# Patient Record
Sex: FEMALE | Race: BLACK OR AFRICAN AMERICAN
[De-identification: names, ages, dates, MRNs, and addresses within clinical notes are randomized per-mention and may not be internally consistent; named-entity substitution may affect disease eponyms.]

---

## 2022-12-20 ENCOUNTER — APPOINTMENT (OUTPATIENT)
Dept: OBGYN | Facility: CLINIC | Age: 32
End: 2022-12-20
Payer: COMMERCIAL

## 2022-12-20 ENCOUNTER — APPOINTMENT (OUTPATIENT)
Dept: ANTEPARTUM | Facility: CLINIC | Age: 32
End: 2022-12-20

## 2022-12-20 VITALS — WEIGHT: 221 LBS | SYSTOLIC BLOOD PRESSURE: 124 MMHG | DIASTOLIC BLOOD PRESSURE: 77 MMHG

## 2022-12-20 DIAGNOSIS — Z36.82 ENCOUNTER FOR ANTENATAL SCREENING FOR NUCHAL TRANSLUCENCY: ICD-10-CM

## 2022-12-20 PROCEDURE — ZZZZZ: CPT

## 2022-12-20 PROCEDURE — 76813 OB US NUCHAL MEAS 1 GEST: CPT

## 2022-12-20 PROCEDURE — 76801 OB US < 14 WKS SINGLE FETUS: CPT

## 2022-12-20 NOTE — HISTORY OF PRESENT ILLNESS
[FreeTextEntry1] : Patient is a 32 year old presenting at 12w4d seen in my office today for nuchal translucency testing. EMERSON 6/30/23. The limitations of testing were discussed with the patient and she was informed that this is a screening test. If she desires a diagnostic test like CVS or Amniocentesis, this may be performed.

## 2022-12-20 NOTE — DISCUSSION/SUMMARY
[FreeTextEntry1] : The significance of nuchal translucency testing was explained to the patient and ultrasound was performed. The sensitivity of the test can be improved by combining with second trimester quad screening. This type of sequential testing minimally increases the false positive rate, but the detection rate for Down syndrome is increased. If the sequential testing is desired, a second stage quad should be drawn and sent. As an alternative, this can be done in our office. If the patient does not desire sequential testing, then a single marker for AFP may be sent to any lab after 15 completed weeks gestation.\par \par Prenatal diagnostic testing is clinically indicated for this patient. The limitations of NIPT testing were discussed with the patient and amniocentesis was noted to remain the gold standard for prenatal diagnostic testing. The significance of NIPT testing was reviewed which pt has already done. NT Blood was drawn and the results will be sent to your office in approximately 2 weeks. Sequential screening is recommended between 15-16 weeks. Anatomy scan is recommended at 19-20 weeks.

## 2023-01-27 ENCOUNTER — NON-APPOINTMENT (OUTPATIENT)
Age: 33
End: 2023-01-27

## 2023-01-27 ENCOUNTER — APPOINTMENT (OUTPATIENT)
Dept: MATERNAL FETAL MEDICINE | Facility: CLINIC | Age: 33
End: 2023-01-27

## 2023-01-30 ENCOUNTER — NON-APPOINTMENT (OUTPATIENT)
Age: 33
End: 2023-01-30

## 2023-02-03 ENCOUNTER — APPOINTMENT (OUTPATIENT)
Dept: MATERNAL FETAL MEDICINE | Facility: CLINIC | Age: 33
End: 2023-02-03
Payer: COMMERCIAL

## 2023-02-03 ENCOUNTER — ASOB RESULT (OUTPATIENT)
Age: 33
End: 2023-02-03

## 2023-02-03 PROCEDURE — G0108 DIAB MANAGE TRN  PER INDIV: CPT

## 2023-02-03 RX ORDER — BLOOD-GLUCOSE METER
W/DEVICE KIT MISCELLANEOUS
Qty: 1 | Refills: 0 | Status: ACTIVE | COMMUNITY
Start: 2023-02-03 | End: 1900-01-01

## 2023-02-03 RX ORDER — BLOOD-GLUCOSE METER
KIT MISCELLANEOUS
Qty: 2 | Refills: 1 | Status: ACTIVE | COMMUNITY
Start: 2023-02-03 | End: 1900-01-01

## 2023-02-03 RX ORDER — CHOLECALCIFEROL (VITAMIN D3) 10(400)/ML
DROPS ORAL
Qty: 2 | Refills: 2 | Status: ACTIVE | COMMUNITY
Start: 2023-02-03 | End: 1900-01-01

## 2023-02-06 ENCOUNTER — APPOINTMENT (OUTPATIENT)
Dept: OBGYN | Facility: CLINIC | Age: 33
End: 2023-02-06

## 2023-02-06 ENCOUNTER — APPOINTMENT (OUTPATIENT)
Dept: ANTEPARTUM | Facility: CLINIC | Age: 33
End: 2023-02-06
Payer: COMMERCIAL

## 2023-02-06 VITALS — DIASTOLIC BLOOD PRESSURE: 72 MMHG | SYSTOLIC BLOOD PRESSURE: 132 MMHG | WEIGHT: 220 LBS

## 2023-02-06 PROCEDURE — 76811 OB US DETAILED SNGL FETUS: CPT

## 2023-02-10 RX ORDER — ALCOHOL ANTISEPTIC PADS
PADS, MEDICATED (EA) TOPICAL
Qty: 1 | Refills: 1 | Status: ACTIVE | COMMUNITY
Start: 2023-02-10 | End: 1900-01-01

## 2023-02-10 RX ORDER — PEN NEEDLE, DIABETIC 32GX 5/32"
32G X 4 MM NEEDLE, DISPOSABLE MISCELLANEOUS
Qty: 1 | Refills: 1 | Status: ACTIVE | COMMUNITY
Start: 2023-02-10 | End: 1900-01-01

## 2023-02-16 ENCOUNTER — APPOINTMENT (OUTPATIENT)
Dept: MATERNAL FETAL MEDICINE | Facility: CLINIC | Age: 33
End: 2023-02-16
Payer: COMMERCIAL

## 2023-02-16 ENCOUNTER — ASOB RESULT (OUTPATIENT)
Age: 33
End: 2023-02-16

## 2023-02-16 PROCEDURE — G0108 DIAB MANAGE TRN  PER INDIV: CPT

## 2023-02-22 RX ORDER — SYRINGE-NEEDLE,INSULIN,0.5 ML 31 GX5/16"
31G X 5/16" SYRINGE, EMPTY DISPOSABLE MISCELLANEOUS
Qty: 1 | Refills: 0 | Status: ACTIVE | COMMUNITY
Start: 2023-02-22 | End: 1900-01-01

## 2023-02-27 ENCOUNTER — EMERGENCY (EMERGENCY)
Facility: HOSPITAL | Age: 33
LOS: 1 days | Discharge: HOME | End: 2023-02-27
Attending: EMERGENCY MEDICINE | Admitting: EMERGENCY MEDICINE
Payer: COMMERCIAL

## 2023-02-27 VITALS
DIASTOLIC BLOOD PRESSURE: 63 MMHG | RESPIRATION RATE: 16 BRPM | HEART RATE: 68 BPM | SYSTOLIC BLOOD PRESSURE: 104 MMHG | OXYGEN SATURATION: 99 % | TEMPERATURE: 99 F

## 2023-02-27 LAB
ALBUMIN SERPL ELPH-MCNC: 3.9 G/DL — SIGNIFICANT CHANGE UP (ref 3.3–5)
ALP SERPL-CCNC: 61 U/L — SIGNIFICANT CHANGE UP (ref 40–120)
ALT FLD-CCNC: 20 U/L — SIGNIFICANT CHANGE UP (ref 4–33)
ANION GAP SERPL CALC-SCNC: 11 MMOL/L — SIGNIFICANT CHANGE UP (ref 7–14)
APPEARANCE UR: CLEAR — SIGNIFICANT CHANGE UP
AST SERPL-CCNC: 18 U/L — SIGNIFICANT CHANGE UP (ref 4–32)
BACTERIA # UR AUTO: ABNORMAL
BASOPHILS # BLD AUTO: 0.04 K/UL — SIGNIFICANT CHANGE UP (ref 0–0.2)
BASOPHILS NFR BLD AUTO: 0.3 % — SIGNIFICANT CHANGE UP (ref 0–2)
BILIRUB SERPL-MCNC: <0.2 MG/DL — SIGNIFICANT CHANGE UP (ref 0.2–1.2)
BILIRUB UR-MCNC: NEGATIVE — SIGNIFICANT CHANGE UP
BUN SERPL-MCNC: 13 MG/DL — SIGNIFICANT CHANGE UP (ref 7–23)
CALCIUM SERPL-MCNC: 9.6 MG/DL — SIGNIFICANT CHANGE UP (ref 8.4–10.5)
CHLORIDE SERPL-SCNC: 105 MMOL/L — SIGNIFICANT CHANGE UP (ref 98–107)
CO2 SERPL-SCNC: 19 MMOL/L — LOW (ref 22–31)
COLOR SPEC: SIGNIFICANT CHANGE UP
CREAT SERPL-MCNC: 0.88 MG/DL — SIGNIFICANT CHANGE UP (ref 0.5–1.3)
DIFF PNL FLD: NEGATIVE — SIGNIFICANT CHANGE UP
EGFR: 90 ML/MIN/1.73M2 — SIGNIFICANT CHANGE UP
EOSINOPHIL # BLD AUTO: 0.26 K/UL — SIGNIFICANT CHANGE UP (ref 0–0.5)
EOSINOPHIL NFR BLD AUTO: 1.8 % — SIGNIFICANT CHANGE UP (ref 0–6)
EPI CELLS # UR: SIGNIFICANT CHANGE UP /HPF (ref 0–5)
GLUCOSE SERPL-MCNC: 89 MG/DL — SIGNIFICANT CHANGE UP (ref 70–99)
GLUCOSE UR QL: NEGATIVE — SIGNIFICANT CHANGE UP
HCG SERPL-ACNC: 4459 MIU/ML — SIGNIFICANT CHANGE UP
HCT VFR BLD CALC: 35.6 % — SIGNIFICANT CHANGE UP (ref 34.5–45)
HGB BLD-MCNC: 11.7 G/DL — SIGNIFICANT CHANGE UP (ref 11.5–15.5)
IANC: 10.87 K/UL — HIGH (ref 1.8–7.4)
IMM GRANULOCYTES NFR BLD AUTO: 0.7 % — SIGNIFICANT CHANGE UP (ref 0–0.9)
KETONES UR-MCNC: ABNORMAL
LEUKOCYTE ESTERASE UR-ACNC: NEGATIVE — SIGNIFICANT CHANGE UP
LYMPHOCYTES # BLD AUTO: 14.4 % — SIGNIFICANT CHANGE UP (ref 13–44)
LYMPHOCYTES # BLD AUTO: 2.06 K/UL — SIGNIFICANT CHANGE UP (ref 1–3.3)
MCHC RBC-ENTMCNC: 26.2 PG — LOW (ref 27–34)
MCHC RBC-ENTMCNC: 32.9 GM/DL — SIGNIFICANT CHANGE UP (ref 32–36)
MCV RBC AUTO: 79.8 FL — LOW (ref 80–100)
MONOCYTES # BLD AUTO: 0.97 K/UL — HIGH (ref 0–0.9)
MONOCYTES NFR BLD AUTO: 6.8 % — SIGNIFICANT CHANGE UP (ref 2–14)
NEUTROPHILS # BLD AUTO: 10.87 K/UL — HIGH (ref 1.8–7.4)
NEUTROPHILS NFR BLD AUTO: 76 % — SIGNIFICANT CHANGE UP (ref 43–77)
NITRITE UR-MCNC: NEGATIVE — SIGNIFICANT CHANGE UP
NRBC # BLD: 0 /100 WBCS — SIGNIFICANT CHANGE UP (ref 0–0)
NRBC # FLD: 0 K/UL — SIGNIFICANT CHANGE UP (ref 0–0)
PH UR: 5.5 — SIGNIFICANT CHANGE UP (ref 5–8)
PLATELET # BLD AUTO: 297 K/UL — SIGNIFICANT CHANGE UP (ref 150–400)
POTASSIUM SERPL-MCNC: 4.2 MMOL/L — SIGNIFICANT CHANGE UP (ref 3.5–5.3)
POTASSIUM SERPL-SCNC: 4.2 MMOL/L — SIGNIFICANT CHANGE UP (ref 3.5–5.3)
PROT SERPL-MCNC: 6.9 G/DL — SIGNIFICANT CHANGE UP (ref 6–8.3)
PROT UR-MCNC: NEGATIVE — SIGNIFICANT CHANGE UP
RBC # BLD: 4.46 M/UL — SIGNIFICANT CHANGE UP (ref 3.8–5.2)
RBC # FLD: 14.8 % — HIGH (ref 10.3–14.5)
RBC CASTS # UR COMP ASSIST: SIGNIFICANT CHANGE UP /HPF (ref 0–4)
SODIUM SERPL-SCNC: 135 MMOL/L — SIGNIFICANT CHANGE UP (ref 135–145)
SP GR SPEC: 1.02 — SIGNIFICANT CHANGE UP (ref 1.01–1.05)
UROBILINOGEN FLD QL: SIGNIFICANT CHANGE UP
WBC # BLD: 14.3 K/UL — HIGH (ref 3.8–10.5)
WBC # FLD AUTO: 14.3 K/UL — HIGH (ref 3.8–10.5)
WBC UR QL: SIGNIFICANT CHANGE UP /HPF (ref 0–5)

## 2023-02-27 PROCEDURE — 99285 EMERGENCY DEPT VISIT HI MDM: CPT

## 2023-02-27 NOTE — ED PROVIDER NOTE - CLINICAL SUMMARY MEDICAL DECISION MAKING FREE TEXT BOX
32-year-old female G1, P0, 22 weeks pregnant, GDM presents emergency department for episode of syncope. +LOC for a few seconds, fell on abdomen/side. Pt denies chest pain, palp, adnominal pain, decreased fetal movement, vag bleeding or discharge. Differentials include but not limited to dehydration, electrolyte abnormalities, PE, vasovagal syncope. PE considered but low concern, PERC negative. Will get labs, EKG, UA, urine culture. Will check FHR and speak with OB. Dispo likely home with ob follow up. 32-year-old female G1, P0, 22 weeks pregnant, GDM presents emergency department for episode of syncope. +LOC for a few seconds, fell on abdomen/side. Pt denies chest pain, palp, adnominal pain, decreased fetal movement, vag bleeding or discharge. Differentials include but not limited to dehydration, electrolyte abnormalities, PE, vasovagal syncope. PE considered but low concern, PERC negative. Will get labs, EKG, UA, urine culture. Will check FHR and speak with OB. Dispo likely home with ob follow up.    daniella: Patient FATAL is a 32-year-old G1, P0 who had a syncope today [place] warm subway [place].  She has syncopized previously.  She denies any abdominal pain shortness of breath fevers or chest pain.  Her EKG is sinus rhythm at 65 without any ischemic ST elevations or depressions no short CO no long QTc and no Brugada.  Fetal heart rate is 155 abdomen is soft nontender and gravid at 22 weeks gestation patient was seen by GYN as well and cleared.  Patient okay to go home is being given fluid to drink and is able to tolerate p.o.  There is no nausea and vomiting.

## 2023-02-27 NOTE — ED PROVIDER NOTE - PHYSICAL EXAMINATION
Constitutional: VS reviewed. Alert and orientedx3, well appearing, no apparent distress  HEENT: Atraumatic, EOMI  CV: RRR, no murmurs  Lungs: Clear and equal bilaterally, no wheezes, rales or crackles  Abdomen: Gravid abdomen, soft, nondistended, nontender  MSK: No deformities  Skin: Warm and dry. As visualized no rashes, lesions, bruising or erythema  Neuro: Strength 5/5 in all extremities. Sensation intact. Normal gait.   Lymph: No pitting edema in extremities.

## 2023-02-27 NOTE — CONSULT NOTE ADULT - ASSESSMENT
31yo  @22+3 wga (EMERSON 23) coming in after an episode of vasovagal symptoms on a hot and crowded train today. Patient had a witness fall onto her left side with her backpack cushioning the fall and no head trauma. Patient currently feels well and is asymptomatic. +FH to 152 and gross fetal movement here in ED.    # Syncope  - Likely vasovagal syncope. Recommend f/u with cardiology given that the patient has episodes of near syncope before during this pregnancy  - EKG wnl  - Patient denies obstetric complaints. Recommend f/u in the office. Her next appointment is in 2 days.    # GDMA2  - Patient to start 10units NPH HS per [facility] note 2023.  - Management per office    d/w Dr. Ashley Leyva, PGY2

## 2023-02-27 NOTE — ED ADULT NURSE NOTE - NSIMPLEMENTINTERV_GEN_ALL_ED
Implemented All Fall Risk Interventions:  [place] to call system. Call bell, personal items and telephone within reach. Instruct patient to call for assistance. Room bathroom lighting operational. Non-slip footwear when patient is off stretcher. Physically safe environment: no spills, clutter or unnecessary equipment. Stretcher in lowest position, wheels locked, appropriate side rails in place. Provide visual cue, wrist band, yellow gown, etc. Monitor gait and stability. Monitor for mental status changes and reorient to person, place, and time. Review medications for side effects contributing to fall risk. Reinforce activity limits and safety measures with patient and family.

## 2023-02-27 NOTE — ED PROVIDER NOTE - OBJECTIVE STATEMENT
32-year-old female G1, P0, 22 weeks pregnant, GDM presents emergency department for episode of syncope.  Patient [place] when she had feeling of being hot, vision blurriness and subsequently lost consciousness landing on left side/abdomen.  Syncopal episode witnessed by bystanders who helped her stand after.  Patient states she lost consciousness for only a few seconds.  Patient denies any shortness of breath, chest pain, dizziness, vision changes after episode.  Patient now feels well.  Patient has confirmed IUP, 22 weeks and is feeling fetal movement before and after syncopal episode.  Patient denies any vaginal bleeding or discharge.  Patient denies fevers, chills, headaches, chest pain, palpitations, SOB, abdominal pain, nausea/vomiting, diarrhea/constipation.

## 2023-02-27 NOTE — ED PROVIDER NOTE - PATIENT PORTAL LINK FT
You can access the FollowMyHealth Patient Portal offered by [facility] by registering at the following website: [email or web address]. By joining [employer]’s FollowMyHealth portal, you will also be able to view your health information using other applications (apps) compatible with our system.

## 2023-02-27 NOTE — ED PROVIDER NOTE - NSFOLLOWUPINSTRUCTIONS_ED_ALL_ED_FT
You were seen in the ED today for an episode of passing out and falling on your abdomen in pregnancy.    All of your lab results are in your paperwork.    The baby's heart rate was 153 which is normal at this point in pregnancy.     Please follow up with your OB in the next week.      If you experience any of the following please return to the ED:  - Passing out  - Chest pain  - Trouble breathing  - Vaginal bleeding  - Abdominal pain or cramping      Syncope, Adult    Outline of the head showing blood vessels that supply the brain.   Syncope is when you pass out or faint for a short time. It is caused by a sudden decrease in blood flow to the brain. This can happen for many reasons. It can sometimes happen when seeing blood, getting a shot (injection), or having pain or strong emotions. Most causes of fainting are not dangerous, but in some cases it can be a sign of a serious medical problem.    If you faint, get help right away. Call your local emergency services (911 in the [place]      Follow these instructions at home:    Watch for any changes in your symptoms. Take these actions to stay safe and help with your symptoms:    Knowing when you may be about to faint   •Signs that you may be about to faint include:  •Feeling dizzy or light-headed. It may feel like the room is spinning.      •Feeling weak.      •Feeling like you may vomit (nauseous).      •Seeing spots or seeing all white or all black.      •Having cold, clammy skin.      •Feeling warm and sweaty.      •Hearing ringing in the ears.      •If you start to feel like you might faint, sit or lie down right away. If sitting, lower your head down between your legs. If lying down, raise (elevate) your feet above the level of your heart.  •Breathe deeply and steadily. Wait until all of the symptoms are gone.      •Have someone stay with you until you feel better.        Medicines     •Take over-the-counter and prescription medicines only as told by your doctor.      •If you are taking blood pressure or heart medicine, sit up and stand up slowly. Spend a few minutes getting ready to sit and then stand. This can help you feel less dizzy.      Lifestyle     • Do not drive, use machinery, or play sports until your doctor says it is okay.      • Do not drink alcohol.      • Do not smoke or use any products that contain nicotine or tobacco. If you need help quitting, ask your doctor.      •Avoid hot tubs and saunas.      General instructions     •Talk with your doctor about your symptoms. You may need to have testing to help find the cause.      •Drink enough fluid to keep your pee (urine) pale yellow.    •Avoid standing for a long time. If you must stand for a long time, do movements such as:  •Moving your legs.       •Crossing your legs.       •Flexing and stretching your leg muscles.       •Squatting.        •Keep all follow-up visits.        Contact a doctor if:    •You have episodes of near fainting.        Get help right away if:    •You pass out or faint.      •You hit your head or are injured after fainting.    •You have any of these symptoms:  •Fast or uneven heartbeats (palpitations).      •Pain in your chest, belly, or back.      •Shortness of breath.        •You have jerky movements that you cannot control (seizure).      •You have a very bad headache.      •You are confused.      •You have problems with how you see (vision).      •You are very weak.      •You have trouble walking.      •You are bleeding from your mouth or your butt (rectum).      •You have black or tarry poop (stool).      These symptoms may be an emergency. Get help right away. Call your local emergency services (911 in the [place]    • Do not wait to see if the symptoms will go away.       • Do not drive yourself to the hospital.         Summary    •Syncope is when you pass out or faint for a short time. It is caused by a sudden decrease in blood flow to the brain.      •Signs that you may be about to faint include feeling dizzy or light-headed, feeling like you may vomit, seeing all white or all black, or having cold, clammy skin.      •If you start to feel like you might faint, sit or lie down right away. Lower your head if sitting, or raise (elevate) your feet if lying down. Breathe deeply and steadily. Wait until all of the symptoms are gone.      This information is not intended to replace advice given to you by your health care provider. Make sure you discuss any questions you have with your health care provider.

## 2023-02-27 NOTE — ED ADULT TRIAGE NOTE - CHIEF COMPLAINT QUOTE
Pt  about 22 weeks pregnant reports she was on train home when she experienced a hot flash and passed out falling on belly. Currently denies abd pain, dizziness, CP. Hx gestational diabetes, fingerstick 90. L&D notified.

## 2023-02-27 NOTE — CONSULT NOTE ADULT - SUBJECTIVE AND OBJECTIVE BOX
EMMIE FATAL  32y  Female [id number]    HPI: 33yo  @22+3 wga (EMERSON 23) coming in with an episode of witnessed syncope on the train today at 4:40pm. Patient reports feeling hot and sweaty and her vision started to go. She believes she only passed out for a few seconds because she was watching a TV show and when she woke up she was on the same scene. Bystanders said that she did not hit her head but she slid onto her left flank. The patient was wearing her backpack on her front and it cushioned her fall. Patient reports an incident a few weeks ago where she felt lightheaded and near syncope but she was able to drink water and grabbed a cold subway pole and it helped. Patient denies cardiac history. She denies abdominal pain, LOF, VB. She has felt fetal movement since falling onto her left side. +FHT to 152 in the ED. EKG performed in ED and NSR    Pregnancy c/b GDMA2, patient to start insulin soon. She had a recent anatomy ultrasound which was normal per her OB's office. Her next office appointment is Wednesday with Dr. Anders's office.     Name of GYN Physician: Dr. Drummond    POB: primigravida  Pgyn: Denies fibroids, cysts, endometriosis, STI's, abnormal pap smears   PMH: Denies  PSH: Denies  Meds: PNV, 81mg ASA  All: NKDA  SH: Denies smoking use, drug use, alcohol use.      Hospital Meds:   MEDICATIONS  (STANDING):    MEDICATIONS  (PRN):      FAMILY HISTORY:      Vital Signs Last 24 Hrs  T(C): 37 (2023 18:06), Max: 37 (2023 18:06)  T(F): 98.6 (2023 18:06), Max: 98.6 (2023 18:06)  HR: 68 (2023 18:06) (68 - 68)  BP: 104/63 (2023 18:06) (104/63 - 104/63)  BP(mean): --  RR: 16 (2023 18:06) (16 - 16)  SpO2: 99% (2023 18:06) (99% - 99%)    Parameters below as of 2023 18:06  Patient On (Oxygen Delivery Method): room air      Physical Exam:   General: sitting comfortably in bed, NAD   CV: RR S1, S2 no m/r/g  Lungs: CTA b/l, good air flow b/l   Abd: Soft, gravid, non-tender. Bowel sounds present.    Ext: non-tender b/l, no edema     LABS:                            11.7   14.30 )-----------( 297      ( 2023 19:50 )             35.6         135  |  105  |  13  ----------------------------<  89  4.2   |  19<L>  |  0.88    Ca    9.6      2023 19:50    TPro  6.9  /  Alb  3.9  /  TBili  <0.2  /  DBili  x   /  AST  18  /  ALT  20  /  AlkPhos  61      I&O's Detail      Urinalysis Basic - ( 2023 20:17 )    Color: Light Yellow / Appearance: Clear / S.017 / pH: x  Gluc: x / Ketone: Small  / Bili: Negative / Urobili: <2 mg/dL   Blood: x / Protein: Negative / Nitrite: Negative   Leuk Esterase: Negative / RBC: 0-1 /HPF / WBC 3-5 /HPF   Sq Epi: x / Non Sq Epi: small /HPF / Bacteria: Few

## 2023-02-27 NOTE — ED PROVIDER NOTE - ATTENDING CONTRIBUTION TO CARE
daniella: Patient FATAL is a 32-year-old G1, P0 who had a syncope today [place] on [place] warm subway [place].  She has syncopized previously.  She denies any abdominal pain shortness of breath fevers or chest pain.  Her EKG is sinus rhythm at 65 without any ischemic ST elevations or depressions no short MS no long QTc and no Brugada.  Fetal heart rate is 155 abdomen is soft nontender and gravid at 22 weeks gestation patient was seen by GYN as well and cleared.  Patient okay to go home is being given fluid to drink and is able to tolerate p.o.  There is no nausea and vomiting.    I performed a history and physical exam of the patient and discussed their management with the resident and /or advanced care provider. I reviewed the resident and /or ACP's note and agree with the documented findings and plan of care. My medical decison making and observations are found above.

## 2023-02-27 NOTE — ED ADULT NURSE NOTE - OBJECTIVE STATEMENT
32 yof presents A&Ox4, 22wks pregnant, c/o syncopal episode on the train. Pt states she experienced a hot flash while standing, then passed out. States she fell on her stomach but denies any abdominal pain, vaginal bleeding or discharge. No PMHx. Denies any prior syncopal episodes. Respirations even and unlabored. Pt denies any chest pain, sob, N/V/D. 18g IV placed in R AC. labs sent per order. Pt aware to call for assistance prior to getting up. bed in lowest position, side rails up, call bell in hand, safety maintained.

## 2023-02-27 NOTE — ED ADULT TRIAGE NOTE - TEMPERATURE IN CELSIUS (DEGREES C)
37 Oral Minoxidil Pregnancy And Lactation Text: This medication should only be used when clearly needed if you are pregnant, attempting to become pregnant or breast feeding.

## 2023-02-28 LAB
CULTURE RESULTS: SIGNIFICANT CHANGE UP
SPECIMEN SOURCE: SIGNIFICANT CHANGE UP

## 2023-03-01 ENCOUNTER — APPOINTMENT (OUTPATIENT)
Dept: OBGYN | Facility: CLINIC | Age: 33
End: 2023-03-01
Payer: COMMERCIAL

## 2023-03-01 ENCOUNTER — APPOINTMENT (OUTPATIENT)
Dept: ANTEPARTUM | Facility: CLINIC | Age: 33
End: 2023-03-01
Payer: COMMERCIAL

## 2023-03-01 VITALS — DIASTOLIC BLOOD PRESSURE: 69 MMHG | SYSTOLIC BLOOD PRESSURE: 120 MMHG | WEIGHT: 219 LBS

## 2023-03-01 PROCEDURE — 76816 OB US FOLLOW-UP PER FETUS: CPT

## 2023-03-01 PROCEDURE — 99213 OFFICE O/P EST LOW 20 MIN: CPT

## 2023-03-01 NOTE — HISTORY OF PRESENT ILLNESS
[FreeTextEntry1] : The patient was seen for follow up of her gestational diabetes. She is 21 weeks pregnant and is managed by diet and insulin. The patient has no new complaints and her blood glucose log was reviewed today. Pt recent A1C was 6% done on 11/14/22. EFW done today. Normal growth - Wt 1lbs 4oz. Normal fluid. Normal movement. +FHR. Pt states she passed out one day on the train. Advised her to get referral to cardio form her OB. Also advised her to stay hydrated. \par \par Fasting sugars range > 90 mg/dL.\par Post prandial sugars range < 140 mg/dL.\par \par The patient is keeping an adequate log and is compliant with treatment.\par \par Fetal biophysical profile was done and fetal status was reassuring.

## 2023-03-01 NOTE — DISCUSSION/SUMMARY
[FreeTextEntry1] : In order to reduce the complications associated with diabetes, the patient was advised to follow dietary advice with the goal of keeping her fasting sugars between 70-90 mg/dL and 1 hour postprandial sugars less than 140 mg/dL.\par \par Diet: The patient was started on a GDM diet based on her body weight and gestational age. The aim of the diet is to gain appropriate weight during pregnancy and not be ketonuric. The diet consists of 3 meals and 3 snacks daily. Each snack will be about 15% of total calories and the patient should have a total carbohydrate intake of about 40%. The patient was advised about the foods to avoid and to monitor her weight regularly.\par \par Exercise: Proper guidelines for exercise and activity were discussed with the patient.\par \par Home Glucose monitoring: The patient was instructed on glucose monitor use using a glucometer. She was advised to check her fasting and post prandial sugars and to record corresponding food intake on the log sheet provided to her today. She was informed that the goal of glucose control was to keep the fasting sugars less than 90 mg/dL and 1 hour post prandial sugars less than 130-140 mg/dL. Signs, symptoms, and management of hypoglycemia were also discussed with her.\par \par Fetal kick counting/fetal movement monitoring, signs and symptoms of preeclampsia,  labor, and fetal surveillance were discussed with her. She was advised to report on any significant change in pattern of fetal movement.\par \par The patient was advised of the need for serial monitoring of fetal growth and for assessment of fetal status. Separate ultrasound reports will be sent to your office. The patient was advised to return in 4 weeks for follow up. In the interim, she was advised to call if there are any problems. \par \par She is seeing a DM educator. She will continue to monitor sugars with DM educator.

## 2023-03-16 ENCOUNTER — ASOB RESULT (OUTPATIENT)
Age: 33
End: 2023-03-16

## 2023-03-16 ENCOUNTER — APPOINTMENT (OUTPATIENT)
Dept: MATERNAL FETAL MEDICINE | Facility: CLINIC | Age: 33
End: 2023-03-16
Payer: COMMERCIAL

## 2023-03-16 PROCEDURE — G0108 DIAB MANAGE TRN  PER INDIV: CPT

## 2023-03-28 ENCOUNTER — APPOINTMENT (OUTPATIENT)
Dept: ANTEPARTUM | Facility: CLINIC | Age: 33
End: 2023-03-28
Payer: COMMERCIAL

## 2023-03-28 ENCOUNTER — APPOINTMENT (OUTPATIENT)
Dept: OBGYN | Facility: CLINIC | Age: 33
End: 2023-03-28
Payer: COMMERCIAL

## 2023-03-28 VITALS
HEIGHT: 64 IN | DIASTOLIC BLOOD PRESSURE: 77 MMHG | BODY MASS INDEX: 38.93 KG/M2 | SYSTOLIC BLOOD PRESSURE: 117 MMHG | WEIGHT: 228 LBS

## 2023-03-28 DIAGNOSIS — O24.419 GESTATIONAL DIABETES MELLITUS IN PREGNANCY, UNSPECIFIED CONTROL: ICD-10-CM

## 2023-03-28 PROCEDURE — 76819 FETAL BIOPHYS PROFIL W/O NST: CPT

## 2023-03-28 PROCEDURE — 76816 OB US FOLLOW-UP PER FETUS: CPT

## 2023-03-28 PROCEDURE — 99213 OFFICE O/P EST LOW 20 MIN: CPT

## 2023-03-28 NOTE — HISTORY OF PRESENT ILLNESS
[FreeTextEntry1] : The patient was seen for follow up of her gestational diabetes. She is 26 weeks pregnant and is managed by diet and insulin (25U). The patient has no new complaints and her blood glucose log was reviewed today. EFW done today. Normal growth - Wt 2lbs 10oz (93rd %tile). Normal fluid. Normal movement. +FHR (see official sono report)\par \par Fasting sugars range < 100 mg/dL (multiple randomly scattered elevated fasting readings)\par Post prandial sugars range < 160 mg/dL. (one elevated reading at 190)\par \par \par Fetal biophysical profile was done and fetal status was reassuring.

## 2023-03-28 NOTE — DISCUSSION/SUMMARY
[FreeTextEntry1] : -Pt to continue seeing DM educator \par -pt to increase insulin to 28U at bedtime \par -she should continue monitoring sugars 4x a day and eat all 4 meals as she is skipping \par -pt to correctly log what she is eating \par -recommended diet and exercise \par -recommended pt to start short acting insulin OR increase long acting insulin, pt to reach out to nutritonist to discuss what insulin she is currently taking \par -discussed need of serial growths every 4 weeks \par -f/u 2 weeks for GDM f/u

## 2023-03-29 ENCOUNTER — NON-APPOINTMENT (OUTPATIENT)
Age: 33
End: 2023-03-29

## 2023-03-30 RX ORDER — INSULIN LISPRO 100 [IU]/ML
100 INJECTION, SOLUTION INTRAVENOUS; SUBCUTANEOUS
Qty: 1 | Refills: 2 | Status: ACTIVE | COMMUNITY
Start: 2023-03-29 | End: 1900-01-01

## 2023-03-31 ENCOUNTER — NON-APPOINTMENT (OUTPATIENT)
Age: 33
End: 2023-03-31

## 2023-03-31 RX ORDER — LANCETS 30 GAUGE
EACH MISCELLANEOUS
Qty: 2 | Refills: 1 | Status: ACTIVE | COMMUNITY
Start: 2023-03-31 | End: 1900-01-01

## 2023-04-04 RX ORDER — URINE ACETONE TEST STRIPS
STRIP MISCELLANEOUS
Qty: 1 | Refills: 0 | Status: ACTIVE | COMMUNITY
Start: 2023-02-03 | End: 1900-01-01

## 2023-04-11 ENCOUNTER — APPOINTMENT (OUTPATIENT)
Dept: OBGYN | Facility: CLINIC | Age: 33
End: 2023-04-11
Payer: COMMERCIAL

## 2023-04-11 VITALS
DIASTOLIC BLOOD PRESSURE: 74 MMHG | SYSTOLIC BLOOD PRESSURE: 124 MMHG | WEIGHT: 228 LBS | BODY MASS INDEX: 38.93 KG/M2 | HEIGHT: 64 IN

## 2023-04-11 PROCEDURE — 99213 OFFICE O/P EST LOW 20 MIN: CPT

## 2023-04-11 NOTE — DISCUSSION/SUMMARY
[FreeTextEntry1] : -pt to continue seeing DM educator\par -pt isnt checking sugars after dinner, counseled her to start checking sugars 4x a day \par -she also isnt complaint with diet as she is eating chip[employer] and drinking kentrell lemonade \par -she is currently taking Levemir(pt unsure which insulin she is taking) 34U at bedtime with fasting sugars still elevated \par -discussed the start of taking levemir twice a day 40U at bedtime and 20U at daytime\par -she is taking Novolog before each meal, advised her to continue \par -recommended pt to have DM educator call [facility] to discuss what insulin she is taking as pt does not know \par -discussed with pt she has educator and OB following her which gets confusing, pt to have  call office tomorrow to discuss \par -EFW done today (see official sono report) \par -f/u 2 weeks for GDM f/u

## 2023-04-11 NOTE — HISTORY OF PRESENT ILLNESS
[FreeTextEntry1] : The patient was seen for follow up of her gestational diabetes. She is 28 weeks pregnant and is managed by diet and insulin. The patient has no new complaints and her blood glucose log was reviewed today. EFW done today. Normal growth - Wt 3lbs 2oz. Normal fluid. Normal movement. +FHR (see official sono report) \par \par Fasting sugars range >100 mg/dL.\par Post prandial sugars range > 140 mg/dL.\par \par The patient is keeping an adequate log and is compliant with treatment.\par \par Fetal biophysical profile was done and fetal status was reassuring. \par \par

## 2023-04-20 ENCOUNTER — APPOINTMENT (OUTPATIENT)
Dept: MATERNAL FETAL MEDICINE | Facility: CLINIC | Age: 33
End: 2023-04-20

## 2023-04-25 ENCOUNTER — APPOINTMENT (OUTPATIENT)
Dept: OBGYN | Facility: CLINIC | Age: 33
End: 2023-04-25
Payer: COMMERCIAL

## 2023-04-25 VITALS
DIASTOLIC BLOOD PRESSURE: 79 MMHG | SYSTOLIC BLOOD PRESSURE: 126 MMHG | WEIGHT: 230 LBS | BODY MASS INDEX: 39.27 KG/M2 | HEIGHT: 64 IN

## 2023-04-25 PROCEDURE — 99212 OFFICE O/P EST SF 10 MIN: CPT

## 2023-04-25 RX ORDER — INSULIN ASPART 100 [IU]/ML
100 INJECTION, SOLUTION INTRAVENOUS; SUBCUTANEOUS
Qty: 1 | Refills: 0 | Status: ACTIVE | COMMUNITY
Start: 2023-04-25 | End: 1900-01-01

## 2023-04-25 RX ORDER — HUMAN INSULIN 100 [IU]/ML
100 INJECTION, SUSPENSION SUBCUTANEOUS
Qty: 1 | Refills: 0 | Status: ACTIVE | COMMUNITY
Start: 2023-02-10 | End: 1900-01-01

## 2023-04-25 RX ORDER — HUMAN INSULIN 100 [IU]/ML
100 INJECTION, SUSPENSION SUBCUTANEOUS
Qty: 3 | Refills: 1 | Status: ACTIVE | COMMUNITY
Start: 2023-03-29 | End: 1900-01-01

## 2023-05-09 ENCOUNTER — APPOINTMENT (OUTPATIENT)
Dept: ANTEPARTUM | Facility: CLINIC | Age: 33
End: 2023-05-09
Payer: COMMERCIAL

## 2023-05-09 ENCOUNTER — APPOINTMENT (OUTPATIENT)
Dept: OBGYN | Facility: CLINIC | Age: 33
End: 2023-05-09
Payer: COMMERCIAL

## 2023-05-09 VITALS — DIASTOLIC BLOOD PRESSURE: 76 MMHG | SYSTOLIC BLOOD PRESSURE: 116 MMHG | BODY MASS INDEX: 39.65 KG/M2 | WEIGHT: 231 LBS

## 2023-05-09 PROCEDURE — ZZZZZ: CPT

## 2023-05-09 PROCEDURE — 76819 FETAL BIOPHYS PROFIL W/O NST: CPT

## 2023-05-09 PROCEDURE — 76816 OB US FOLLOW-UP PER FETUS: CPT

## 2023-05-23 ENCOUNTER — APPOINTMENT (OUTPATIENT)
Dept: OBGYN | Facility: CLINIC | Age: 33
End: 2023-05-23
Payer: COMMERCIAL

## 2023-05-23 VITALS — BODY MASS INDEX: 40.17 KG/M2 | DIASTOLIC BLOOD PRESSURE: 76 MMHG | SYSTOLIC BLOOD PRESSURE: 123 MMHG | WEIGHT: 234 LBS

## 2023-05-23 PROCEDURE — ZZZZZ: CPT

## 2023-05-30 ENCOUNTER — APPOINTMENT (OUTPATIENT)
Dept: ANTEPARTUM | Facility: CLINIC | Age: 33
End: 2023-05-30
Payer: COMMERCIAL

## 2023-05-30 ENCOUNTER — APPOINTMENT (OUTPATIENT)
Dept: OBGYN | Facility: CLINIC | Age: 33
End: 2023-05-30
Payer: COMMERCIAL

## 2023-05-30 VITALS — DIASTOLIC BLOOD PRESSURE: 73 MMHG | BODY MASS INDEX: 40.34 KG/M2 | WEIGHT: 235 LBS | SYSTOLIC BLOOD PRESSURE: 119 MMHG

## 2023-05-30 PROCEDURE — 76818 FETAL BIOPHYS PROFILE W/NST: CPT

## 2023-05-30 PROCEDURE — ZZZZZ: CPT

## 2023-06-06 ENCOUNTER — APPOINTMENT (OUTPATIENT)
Dept: ANTEPARTUM | Facility: CLINIC | Age: 33
End: 2023-06-06
Payer: COMMERCIAL

## 2023-06-06 ENCOUNTER — APPOINTMENT (OUTPATIENT)
Dept: OBGYN | Facility: CLINIC | Age: 33
End: 2023-06-06
Payer: COMMERCIAL

## 2023-06-06 VITALS
BODY MASS INDEX: 40.46 KG/M2 | WEIGHT: 237 LBS | SYSTOLIC BLOOD PRESSURE: 122 MMHG | DIASTOLIC BLOOD PRESSURE: 79 MMHG | HEIGHT: 64 IN

## 2023-06-06 DIAGNOSIS — Z00.00 ENCOUNTER FOR GENERAL ADULT MEDICAL EXAMINATION W/OUT ABNORMAL FINDINGS: ICD-10-CM

## 2023-06-06 PROCEDURE — 99213 OFFICE O/P EST LOW 20 MIN: CPT

## 2023-06-06 PROCEDURE — 76818 FETAL BIOPHYS PROFILE W/NST: CPT

## 2023-06-06 RX ORDER — SYRINGE-NEEDLE,INSULIN,0.5 ML 31 GX5/16"
31G X 5/16" SYRINGE, EMPTY DISPOSABLE MISCELLANEOUS
Qty: 30 | Refills: 0 | Status: ACTIVE | COMMUNITY
Start: 2023-06-06 | End: 1900-01-01

## 2023-06-06 RX ORDER — LANCETS 30 GAUGE
EACH MISCELLANEOUS
Qty: 30 | Refills: 0 | Status: ACTIVE | COMMUNITY
Start: 2023-06-06 | End: 1900-01-01

## 2023-06-06 RX ORDER — HUMAN INSULIN 100 [IU]/ML
100 INJECTION, SUSPENSION SUBCUTANEOUS AT BEDTIME
Qty: 3 | Refills: 1 | Status: ACTIVE | COMMUNITY
Start: 2023-06-06 | End: 1900-01-01

## 2023-06-06 RX ORDER — INSULIN ASPART 100 [IU]/ML
100 INJECTION, SOLUTION INTRAVENOUS; SUBCUTANEOUS
Qty: 1 | Refills: 2 | Status: ACTIVE | COMMUNITY
Start: 2023-06-06 | End: 1900-01-01

## 2023-06-13 ENCOUNTER — APPOINTMENT (OUTPATIENT)
Dept: ANTEPARTUM | Facility: CLINIC | Age: 33
End: 2023-06-13
Payer: COMMERCIAL

## 2023-06-13 ENCOUNTER — APPOINTMENT (OUTPATIENT)
Dept: OBGYN | Facility: CLINIC | Age: 33
End: 2023-06-13
Payer: COMMERCIAL

## 2023-06-13 VITALS
HEIGHT: 64 IN | SYSTOLIC BLOOD PRESSURE: 133 MMHG | DIASTOLIC BLOOD PRESSURE: 82 MMHG | BODY MASS INDEX: 40.46 KG/M2 | WEIGHT: 237 LBS

## 2023-06-13 PROCEDURE — 76816 OB US FOLLOW-UP PER FETUS: CPT

## 2023-06-13 PROCEDURE — 99213 OFFICE O/P EST LOW 20 MIN: CPT

## 2023-06-13 PROCEDURE — 76818 FETAL BIOPHYS PROFILE W/NST: CPT | Mod: 59

## 2023-06-19 NOTE — DISCUSSION/SUMMARY
[FreeTextEntry1] : We have adjusted her blood sugars based on the readings. If sugars continue to remain poorly controlled recommend delivery at 38 weeks otherwise if sugar control improves , should deliver by 39 weeks

## 2023-06-19 NOTE — HISTORY OF PRESENT ILLNESS
[FreeTextEntry1] : The patient was seen for follow up of her gestational diabetes. She is 36 weeks pregnant and is managed by diet and insulin. The patient has no new complaints and her blood glucose log was reviewed today.\par \par Fasting sugars range  mg/dL.\par Post prandial sugars range : Several elevated postprandial readings noted randomly probably due to dietary indiscretions. Patient was counseled. Novolin at night was increased to 58 units and patient has already increased humalog to 46 units with each meal\par \par The patient is keeping an adequate log and is compliant with treatment.\par \par Fetal biophysical profile was done and fetal status was reassuring.\par \par \par \par

## 2023-06-20 ENCOUNTER — APPOINTMENT (OUTPATIENT)
Dept: ANTEPARTUM | Facility: CLINIC | Age: 33
End: 2023-06-20
Payer: COMMERCIAL

## 2023-06-20 ENCOUNTER — APPOINTMENT (OUTPATIENT)
Dept: OBGYN | Facility: CLINIC | Age: 33
End: 2023-06-20
Payer: COMMERCIAL

## 2023-06-20 PROCEDURE — ZZZZZ: CPT

## 2023-06-20 PROCEDURE — 76818 FETAL BIOPHYS PROFILE W/NST: CPT

## 2023-06-20 NOTE — OB SUMMARY
[FreeTextEntry2] : EMERSON: 06/30/2023 [Date: _____] : Date: [unfilled] [Gestational Age: _____] : Gestational Age: [unfilled] [FreeTextEntry1] : The patient's diet and blood sugars were reviewed and fasting sugars are borderline normal. Scattered elevated reading postprandial d/t dietary indiscretions. Pt to increase Novalin and Novalog for management. The patient will continue with current management.\par - f/u in 2 weeks for repeat evaluation of blood sugar levels \par -f/u 2 weeks for EFW \par -rx sentn for insulin  [de-identified] : dn [FreeTextEntry9] : EFW done today. Normal growth - Wt 5lbs 3oz (84th%tile). Normal fluid. Normal movement. +FHR (see official sono report). The patient's diet and blood sugars were reviewed and are still elevated. pt to increase novalin to 52U, and continue novalog 30/30/30. The patient will continue with current management.\par - f/u in 2 weeks for repeat evaluation of blood sugar levels  [de-identified] : cony [de-identified] : EFW done today. Normal growth - Wt 5lbs 13oz. Normal fluid. Normal movement. +FHR. The patient's diet and blood sugars were reviewed and are well controlled. The patient will continue with current management.\par - f/u in 1 weeks for repeat evaluation of blood sugar levels\par -recommended delivery at 39 weeks \par -RTO 1 week for BPP/NST  [de-identified] : cony [de-identified] : PT presents to the office for NST/BPP. PT is GDM, pt sugar logs are not complete, pt is not eating small meals throughout the day, pt was advised in detail how important it is to have small meals and health snacks throughout the day. \par - Baby is Vertex\par - \par - Fluid is normal, Movement is normal ( see official sono report) no [de-identified] : dn  [de-identified] : Pt presents for EFW. EFW done today. LGA - Wt 8lbs 4oz (93%). Normal fluid. Normal movement. +FHR (see official sono report). Discussed risks such as shoulder dystocia d/t large AC. The patient's diet and blood sugars were reviewed and are well controlled, except some elevated postprandial readings at 150. The patient will continue with current management.\par - f/u in 1 week for gdm f/u and bpp/nst  [de-identified] : cony  [de-identified] : pt presents for BPP/NST. BPP/NST done today 10/10. The patient's diet and blood sugars were reviewed and are well controlled. The patient will continue with current management. \par -labor precautions given \par -pt to monitor fetal movement\par -delivery scheduled on Sunday  [de-identified] : cony

## 2023-09-19 LAB — HIV1+2 AB SPEC QL IA.RAPID: NONREACTIVE

## 2024-07-28 NOTE — ED ADULT NURSE NOTE - BEFAST BALANCE
Le Looney MD Northern Colorado Long Term Acute HospitalursUofL Health - Jewish Hospital follow up          Recent Outpatient Visits              3 months ago Acute sinusitis, recurrence not specified, unspecified [facility]    [facility]    7 months ago Anxiety    [facility]    [facility]    11 months ago Essential hypertension    [facility]    [facility]    1 year ago Smoking    [facility]    1  year ago Viral syndrome    [facility], Le Verduzco MD    [facility]           No